# Patient Record
Sex: MALE | Race: WHITE | Employment: FULL TIME | URBAN - METROPOLITAN AREA
[De-identification: names, ages, dates, MRNs, and addresses within clinical notes are randomized per-mention and may not be internally consistent; named-entity substitution may affect disease eponyms.]

---

## 2017-05-02 ENCOUNTER — LAB ENCOUNTER (OUTPATIENT)
Dept: LAB | Age: 72
End: 2017-05-02
Payer: COMMERCIAL

## 2017-05-02 DIAGNOSIS — I10 HYPERTENSION, ESSENTIAL: Primary | ICD-10-CM

## 2017-05-02 PROCEDURE — 80048 BASIC METABOLIC PNL TOTAL CA: CPT

## 2017-05-02 PROCEDURE — 82570 ASSAY OF URINE CREATININE: CPT

## 2017-05-02 PROCEDURE — 82043 UR ALBUMIN QUANTITATIVE: CPT

## 2018-04-05 PROBLEM — Z96.651 STATUS POST RIGHT KNEE REPLACEMENT: Status: ACTIVE | Noted: 2018-04-05

## 2021-05-11 PROBLEM — Z00.00 ENCOUNTER FOR PREVENTIVE HEALTH EXAMINATION: Status: ACTIVE | Noted: 2021-05-11

## 2021-06-04 ENCOUNTER — APPOINTMENT (OUTPATIENT)
Dept: ORTHOPEDIC SURGERY | Facility: CLINIC | Age: 76
End: 2021-06-04
Payer: COMMERCIAL

## 2021-06-04 VITALS
WEIGHT: 185 LBS | SYSTOLIC BLOOD PRESSURE: 160 MMHG | HEIGHT: 74 IN | DIASTOLIC BLOOD PRESSURE: 93 MMHG | HEART RATE: 88 BPM | BODY MASS INDEX: 23.74 KG/M2

## 2021-06-04 DIAGNOSIS — Z87.39 PERSONAL HISTORY OF OTHER DISEASES OF THE MUSCULOSKELETAL SYSTEM AND CONNECTIVE TISSUE: ICD-10-CM

## 2021-06-04 DIAGNOSIS — M17.11 UNILATERAL PRIMARY OSTEOARTHRITIS, RIGHT KNEE: ICD-10-CM

## 2021-06-04 DIAGNOSIS — Z78.9 OTHER SPECIFIED HEALTH STATUS: ICD-10-CM

## 2021-06-04 DIAGNOSIS — Z86.79 PERSONAL HISTORY OF OTHER DISEASES OF THE CIRCULATORY SYSTEM: ICD-10-CM

## 2021-06-04 DIAGNOSIS — M16.11 UNILATERAL PRIMARY OSTEOARTHRITIS, RIGHT HIP: ICD-10-CM

## 2021-06-04 DIAGNOSIS — M17.12 UNILATERAL PRIMARY OSTEOARTHRITIS, LEFT KNEE: ICD-10-CM

## 2021-06-04 PROCEDURE — 99072 ADDL SUPL MATRL&STAF TM PHE: CPT

## 2021-06-04 PROCEDURE — 99204 OFFICE O/P NEW MOD 45 MIN: CPT

## 2021-06-04 PROCEDURE — 73501 X-RAY EXAM HIP UNI 1 VIEW: CPT | Mod: RT

## 2021-06-04 PROCEDURE — 73562 X-RAY EXAM OF KNEE 3: CPT | Mod: LT

## 2021-06-04 RX ORDER — ATORVASTATIN CALCIUM 20 MG/1
20 TABLET, FILM COATED ORAL
Refills: 0 | Status: ACTIVE | COMMUNITY

## 2021-06-04 RX ORDER — AMLODIPINE BESYLATE 5 MG/1
TABLET ORAL
Refills: 0 | Status: ACTIVE | COMMUNITY

## 2021-06-04 RX ORDER — ASPIRIN 81 MG
81 TABLET, DELAYED RELEASE (ENTERIC COATED) ORAL
Refills: 0 | Status: ACTIVE | COMMUNITY

## 2021-06-04 NOTE — HISTORY OF PRESENT ILLNESS
[Pain Location] : pain [7] : a current pain level of 7/10 [2] : a minimum pain level of 2/10 [8] : a maximum pain level of 8/10 [Walking] : worsened by walking [Rest] : relieved by rest [Bending] : worsened by bending [Hip Movement] : worsened by hip movement [de-identified] : 74 y/o M presents with left knee pain which started a couple of months ago. He is having sharp pain. The pain is in the medial aspect of the knee. He has difficulty walking due to his pain. He is s/p right TKA with a good outcome. He also c/o right hip pain which he describes as sharp. The pain sometimes radiates down his right leg. He has difficulty with bending as well as putting on his shoes and socks. He has more pain in the left knee than in the right hip. He has no hx of blood clots.

## 2021-06-04 NOTE — REVIEW OF SYSTEMS
[Joint Pain] : joint pain [Joint Stiffness] : joint stiffness [Joint Swelling] : joint swelling [Negative] : Heme/Lymph [FreeTextEntry9] : left knee and right hip

## 2021-06-04 NOTE — PHYSICAL EXAM
[LE] : Sensory: Intact in bilateral lower extremities [ALL] : dorsalis pedis, posterior tibial, femoral, popliteal, and radial 2+ and symmetric bilaterally [Normal] : Alert and in no acute distress [Cane] : ambulates with cane [Poor Appearance] : well-appearing [de-identified] : GENERAL APPEARANCE: Well nourished and hydrated, pleasant, alert, and oriented x 3. Appears their stated age. \par HEENT: Normocephalic, extraocular eye motion intact. Nasal septum midline. Oral cavity clear. External auditory canal clear. \par RESPIRATORY: Breath sounds clear and audible in all lobes. No wheezing, No accessory muscle use.\par CARDIOVASCULAR: No apparent abnormalities. No lower leg edema. No varicosities. Pedal pulses are palpable.\par NEUROLOGIC: Sensation is normal, no muscle weakness in the upper or lower extremities.\par DERMATOLOGIC: No apparent skin lesions, moist, warm, no rash.\par SPINE: Cervical spine appears normal and moves freely; thoracic spine appears normal and moves freely; lumbosacral spine appears normal and moves freely, normal, nontender.\par MUSCULOSKELETAL: Hands, wrists, and elbows are normal and move freely, shoulders are normal and move freely.  [de-identified] : Left knee exam shows medial joint line tenderness, no effusion\par Right hip exam shows pain with SLR, positive Stinchfield [de-identified] : 3V xray of the left knee done in the office today and reviewed by Dr. Lucian Rawls demonstrates bone on bone medial compartmental osteoarthritis \par \par \par 1V xray of the pelvis done in the office today and reviewed by Dr. Lucian Rawls demonstrates bone on bone right hip osteoarthritis

## 2021-06-04 NOTE — DISCUSSION/SUMMARY
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 Conservative therapy and surgical options discussed in detail with the patient. The pt is a candidate for a left TKA and a right GWEN. We counseled the pt regarding which surgery he should pursue first. The pt understands that he should pursue surgery for the joint with the most pain. He comprehends the risk of infection for both the TKA and GWEN. He is also aware of the risks of dislocation and falling post-op for the GWEN. We discussed pre-op, surgery, and post-op in detail. He is experiencing more pain in the left knee than in the right hip. He will consider pursuing the left TKA. In regards for temporary relief for the right hip, we provided an rx for an US guided hip injection. If he decides to pursue the left TKA, he will contact the office. All his questions were answered. \par \par The patient is a 75 year individual with end stage arthritis of their left knee joint. Based upon the patient's continued symptoms and failure to respond to conservative treatment I have recommended a left total knee arthroplasty for this patient. A long discussion took place with the patient describing what a total joint replacement is and what the procedure would entail. A total knee arthroplasty model, similar to the implant that was used during the operation, was utilized to demonstrate and to discuss the various bearing surfaces of the implants. The hospitalization and post-operative care and rehabilitation were also discussed. The use of perioperative antibiotics and DVT prophylaxis were discussed. The risk, benefits and alternatives to a surgical intervention were discussed at length with the patient. The patient was also advised of risks related to the medical comorbidities, elevated body mass index (BMI), and smoking where applicable. We discussed how to reduce modifiable risk factors and encouraged smoking cessation were applicable.. A lengthy discussion took place to review the most common complications including but not limited to: deep vein thrombosis, pulmonary embolus, heart attack, stroke, infection, wound breakdown, numbness, damage to nerves, tendon, muscles, arteries or other blood vessels, death and other possible complications from anesthesia. The patient was told that we will take steps to minimize these risks by using sterile technique, antibiotics and DVT prophylaxis when appropriate and follow the patient postoperatively in the office setting to monitor progress. The possibility of recurrent pain, no improvement in pain and actual worsening of pain were also discussed with the patient.\par The discharge plan of care focused on the patient going home following surgery. The patient was encouraged to make the necessary arrangements to have someone stay with them when they are discharged home. Following discharge, a home care nurse was to the patient. The home care nurse would open the patient’s home care case and request home physical therapy services. Home physical therapy was to commence following discharge provided it was appropriate and covered by the health insurance benefit plan. \par The benefits of surgery were discussed with the patient including the potential for improving his current clinical condition through operative intervention. Alternatives to surgical intervention including continued conservative management were also discussed in detail. All questions were answered to the satisfaction of the patient. The treatment plan of care, as well as a model of a total knee arthroplasty equivalent to the one that will be used for their total joint replacement, was shared with the patient. The patient agreed to the plan of care as well as the use of implants in their total joint replacement. \par \par The patient is a 75 year individual with end stage arthritis of their right hip joint. Based upon the patient's continued symptoms and failure to respond to conservative treatment I have recommended a right total hip arthroplasty for this patient. A long discussion took place with the patient describing what a total joint replacement is and what the procedure would entail. A total hip arthroplasty model, similar to the implant that will be used during the operation, was utilized to demonstrate and to discuss the various bearing surfaces of the implants. The hospitalization and post-operative care and rehabilitation were also discussed. The use of perioperative antibiotics and DVT prophylaxis were discussed. The risk, benefits and alternatives to a surgical intervention were discussed at length with the patient. The patient was also advised of risks related to the medical comorbidities, elevated body mass index (BMI), and smoking where applicable. We discussed how to reduce modifiable risk factors and encouraged smoking cessation were applicable.. A lengthy discussion took place to review the most common complications including but not limited to: deep vein thrombosis, pulmonary embolus, heart attack, stroke, infection, wound breakdown, numbness, damage to nerves, tendon, muscles, arteries or other blood vessels, death and other possible complications from anesthesia. The patient was told that we will take steps to minimize these risks by using sterile technique, antibiotics and DVT prophylaxis when appropriate and follow the patient postoperatively in the office setting to monitor progress. The possibility of recurrent pain, no improvement in pain and actual worsening of pain were also discussed with the patient.\par The discharge plan of care focused on the patient going home following surgery. The patient was encouraged to make the necessary arrangements to have someone stay with them when they are discharged home. Following discharge, a home care nurse will visit the patient. The home care nurse will open your home care case and request home physical therapy services. Home physical therapy will commence following discharge provided it is appropriate and covered by the health insurance benefit plan. \par The benefits of surgery were discussed with the patient including the potential for improving his current clinical condition through operative intervention. Alternatives to surgical intervention including continued conservative management were also discussed in detail. All questions were answered to the satisfaction of the patient. The treatment plan of care, as well as a model of a total hip arthroplasty equivalent to the one that will be used for their total joint replacement, was shared with the patient. The patient agreed to the plan of care as well as the use of implants in their total joint replacement.

## 2021-06-04 NOTE — END OF VISIT
[FreeTextEntry3] : I, Jarrod Villa, acted solely as a scribe for Dr. Lucian Rawls on this date 06/04/2021.

## 2021-06-28 ENCOUNTER — APPOINTMENT (OUTPATIENT)
Dept: INTERVENTIONAL RADIOLOGY/VASCULAR | Facility: CLINIC | Age: 76
End: 2021-06-28

## 2021-07-29 ENCOUNTER — APPOINTMENT (OUTPATIENT)
Dept: ORTHOPEDIC SURGERY | Facility: HOSPITAL | Age: 76
End: 2021-07-29

## 2021-08-18 ENCOUNTER — APPOINTMENT (OUTPATIENT)
Dept: ORTHOPEDIC SURGERY | Facility: CLINIC | Age: 76
End: 2021-08-18

## 2021-09-21 ENCOUNTER — APPOINTMENT (OUTPATIENT)
Dept: ORTHOPEDIC SURGERY | Facility: CLINIC | Age: 76
End: 2021-09-21

## 2021-10-20 ENCOUNTER — APPOINTMENT (OUTPATIENT)
Dept: ORTHOPEDIC SURGERY | Facility: CLINIC | Age: 76
End: 2021-10-20

## 2022-05-06 ENCOUNTER — APPOINTMENT (OUTPATIENT)
Dept: UROLOGY | Facility: CLINIC | Age: 77
End: 2022-05-06
Payer: MEDICARE

## 2022-05-06 VITALS
HEART RATE: 81 BPM | HEIGHT: 74 IN | WEIGHT: 183 LBS | SYSTOLIC BLOOD PRESSURE: 167 MMHG | BODY MASS INDEX: 23.49 KG/M2 | DIASTOLIC BLOOD PRESSURE: 75 MMHG

## 2022-05-06 DIAGNOSIS — N52.9 MALE ERECTILE DYSFUNCTION, UNSPECIFIED: ICD-10-CM

## 2022-05-06 PROCEDURE — 99203 OFFICE O/P NEW LOW 30 MIN: CPT

## 2022-05-06 RX ORDER — TADALAFIL 20 MG/1
20 TABLET ORAL
Qty: 10 | Refills: 3 | Status: ACTIVE | COMMUNITY
Start: 2022-05-06 | End: 1900-01-01

## 2022-05-06 NOTE — PHYSICAL EXAM
[General Appearance - Well Developed] : well developed [General Appearance - Well Nourished] : well nourished [General Appearance - In No Acute Distress] : no acute distress [Urethral Meatus] : meatus normal [Penis Abnormality] : normal circumcised penis [Testes Tenderness] : no tenderness of the testes [Testes Mass (___cm)] : there were no testicular masses [Prostate Size ___ (0-4)] : prostate size [unfilled] (scale: 0-4) [Oriented To Time, Place, And Person] : oriented to person, place, and time

## 2022-05-14 NOTE — ASSESSMENT
[FreeTextEntry1] : Advise PSA and testosterone level, Trial of tadalafil 20 mg po 1 hour before coitus as needed, precautions reviewed

## 2022-05-14 NOTE — HISTORY OF PRESENT ILLNESS
[FreeTextEntry1] : c/o ED Demies AM erections/ unable to penetrate [Erectile Dysfunction] : Erectile Dysfunction [Dysuria] : no dysuria [Hematuria - Gross] : no gross hematuria [None] : None

## 2022-07-28 RX ORDER — SILDENAFIL 100 MG/1
100 TABLET, FILM COATED ORAL
Qty: 30 | Refills: 3 | Status: ACTIVE | COMMUNITY
Start: 2022-07-27 | End: 1900-01-01

## 2023-02-22 ENCOUNTER — OFFICE (OUTPATIENT)
Dept: URBAN - METROPOLITAN AREA CLINIC 114 | Facility: CLINIC | Age: 78
Setting detail: OPHTHALMOLOGY
End: 2023-02-22
Payer: MEDICARE

## 2023-02-22 DIAGNOSIS — H43.813: ICD-10-CM

## 2023-02-22 DIAGNOSIS — H25.89: ICD-10-CM

## 2023-02-22 DIAGNOSIS — H25.13: ICD-10-CM

## 2023-02-22 PROCEDURE — 92004 COMPRE OPH EXAM NEW PT 1/>: CPT | Performed by: SPECIALIST

## 2023-02-22 ASSESSMENT — REFRACTION_MANIFEST
OS_CYLINDER: -1.00
OD_ADD: +2.50
OD_VA2: 20/20(J1+)
OS_SPHERE: +0.75
OS_ADD: +2.50
OD_CYLINDER: -0.50
OD_SPHERE: PLANO
OS_AXIS: 45
OD_VA1: 20/25
OS_VA1: 20/20-1
OS_VA2: 20/20(J1+)
OD_AXIS: 135

## 2023-02-22 ASSESSMENT — SPHEQUIV_DERIVED
OS_SPHEQUIV: 0.25
OS_SPHEQUIV: 0

## 2023-02-22 ASSESSMENT — REFRACTION_AUTOREFRACTION
OS_CYLINDER: -1.50
OS_AXIS: 50
OS_SPHERE: +0.75
OD_SPHERE: UNABLE

## 2023-02-22 ASSESSMENT — KERATOMETRY
OD_AXISANGLE_DEGREES: 132
OD_K2POWER_DIOPTERS: 43.00
OD_K1POWER_DIOPTERS: 42.50
OS_AXISANGLE_DEGREES: 112
OS_K2POWER_DIOPTERS: 42.75
OS_K1POWER_DIOPTERS: 42.50

## 2023-02-22 ASSESSMENT — VISUAL ACUITY
OD_BCVA: 20/40+
OS_BCVA: 20/30+1

## 2023-02-22 ASSESSMENT — AXIALLENGTH_DERIVED
OS_AL: 23.818
OS_AL: 23.9177

## 2023-02-22 ASSESSMENT — CONFRONTATIONAL VISUAL FIELD TEST (CVF)
OD_FINDINGS: FULL
OS_FINDINGS: FULL

## 2023-11-01 ENCOUNTER — OFFICE (OUTPATIENT)
Dept: URBAN - METROPOLITAN AREA CLINIC 114 | Facility: CLINIC | Age: 78
Setting detail: OPHTHALMOLOGY
End: 2023-11-01
Payer: MEDICARE

## 2023-11-01 DIAGNOSIS — H25.13: ICD-10-CM

## 2023-11-01 DIAGNOSIS — H43.813: ICD-10-CM

## 2023-11-01 DIAGNOSIS — H25.89: ICD-10-CM

## 2023-11-01 PROCEDURE — 99213 OFFICE O/P EST LOW 20 MIN: CPT | Performed by: SPECIALIST

## 2023-11-01 ASSESSMENT — REFRACTION_AUTOREFRACTION
OS_AXIS: 43
OD_CYLINDER: -0.25
OS_CYLINDER: -1.25
OD_SPHERE: -0.50
OD_AXIS: 109
OS_SPHERE: +1.00

## 2023-11-01 ASSESSMENT — CONFRONTATIONAL VISUAL FIELD TEST (CVF)
OD_FINDINGS: FULL
OS_FINDINGS: FULL

## 2023-11-01 ASSESSMENT — SPHEQUIV_DERIVED
OS_SPHEQUIV: 0.375
OD_SPHEQUIV: -0.625

## 2024-05-09 ENCOUNTER — RX ONLY (RX ONLY)
Age: 79
End: 2024-05-09

## 2024-05-09 ENCOUNTER — OFFICE (OUTPATIENT)
Dept: URBAN - METROPOLITAN AREA CLINIC 104 | Facility: CLINIC | Age: 79
Setting detail: OPHTHALMOLOGY
End: 2024-05-09
Payer: MEDICARE

## 2024-05-09 DIAGNOSIS — H43.813: ICD-10-CM

## 2024-05-09 DIAGNOSIS — H25.13: ICD-10-CM

## 2024-05-09 DIAGNOSIS — H25.89: ICD-10-CM

## 2024-05-09 PROCEDURE — 92014 COMPRE OPH EXAM EST PT 1/>: CPT | Performed by: SPECIALIST

## 2024-05-09 ASSESSMENT — CONFRONTATIONAL VISUAL FIELD TEST (CVF)
OS_FINDINGS: FULL
OD_FINDINGS: FULL

## 2024-06-04 ENCOUNTER — APPOINTMENT (OUTPATIENT)
Dept: ORTHOPEDIC SURGERY | Facility: CLINIC | Age: 79
End: 2024-06-04

## 2024-06-04 ENCOUNTER — APPOINTMENT (OUTPATIENT)
Dept: ORTHOPEDIC SURGERY | Facility: CLINIC | Age: 79
End: 2024-06-04
Payer: MEDICARE

## 2024-06-04 VITALS — WEIGHT: 182 LBS | BODY MASS INDEX: 23.36 KG/M2 | RESPIRATION RATE: 14 BRPM | HEIGHT: 74 IN

## 2024-06-04 DIAGNOSIS — M65.831 OTHER SYNOVITIS AND TENOSYNOVITIS, RIGHT FOREARM: ICD-10-CM

## 2024-06-04 PROCEDURE — 99204 OFFICE O/P NEW MOD 45 MIN: CPT

## 2024-06-04 PROCEDURE — 73110 X-RAY EXAM OF WRIST: CPT | Mod: 50

## 2024-06-04 NOTE — PHYSICAL EXAM
[de-identified] : Physical exam shows the patient to be alert and oriented x3, capable of ambulation. The patient is well-developed and well-nourished in no apparent respiratory distress. Majority of the skin is intact bilaterally in the upper extremities without lymphadenopathy at the elbows.  There is a mass palpable along the root of the EPL which increases and decreases in size with contraction.  There is no tenderness over the scaphoid scapholunate or lunotriquetral ligament no pain upon forced flexion extension pronation supination.  No tenderness over the CMC or STT joints bilaterally.  No tenderness over the FCR or second dorsal compartment no tenderness over the fourth fifth or sixth extensor compartments.  There is good capillary refill of the digits bilaterally.There is no masses or sensitivity over the median and ulnar nerves at the level of the wrist. There is a negative Tinel's and negative Phalen's sign bilaterally. The sensation is grossly intact bilaterally. [de-identified] : PA lateral and oblique of both wrist shows radial scaphoid arthritis with incongruity of the littlest lines on the right greater than left side.  The midcarpal joints appear to be well-preserved.  There is also degenerative changes of the STT joint bilaterally.  MRI of the right wrist demonstrates a 2.4 cm fluid collection in the dorsal region of the first CMC joint consistent with tenosynovitis of the EPL.  Which structurally and functionally appears to be in tact on physical exam.  The EPB APL and extensor digitorum ECU tendinopathy appears to be clinically asymptomatic as well as the first CMC arthritis and TFCC complex tear which was seen on MRI.  The capitate distal scaphoid and distal radius subcortical degeneration also clinically asymptomatic as well as the radial scaphoid effusion.

## 2024-06-04 NOTE — HISTORY OF PRESENT ILLNESS
[Right] : right hand dominant [FreeTextEntry1] : Patient here for initial evaluation of right wrist tenosynovitis of the EPL tendon.  Patient had 1 previous aspiration by outside MD with minimal fluid aspirated.  Patient has MRI from December 30, 2023 which is suggestive of tenosynovitis of the right wrist with associated arthritis.  Patient noticed the mass approximately 6 months ago.

## 2024-06-04 NOTE — CONSULT LETTER
[Dear  ___] : Dear  [unfilled], [Consult Letter:] : I had the pleasure of evaluating your patient, [unfilled]. [Please see my note below.] : Please see my note below. [Consult Closing:] : Thank you very much for allowing me to participate in the care of this patient.  If you have any questions, please do not hesitate to contact me. [Sincerely,] : Sincerely, [FreeTextEntry3] : Tawanda Bailey M.D., FAAOS Co-Director [facility]

## 2024-06-04 NOTE — ASSESSMENT
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  His chief complaint appears to be coming from the soft tissue mass around the EPL which is suggestive of an EPL tendinitis without rupture.  Options were discussed ranging from conservative management, aspiration, injection, or surgery.  Risk associated with each option discussed including the risk of tendon rupture, development of symptomatic arthritis, tissue loss functional loss and other issues associated with both conservative management injections and surgery were discussed and all questions answered.  Patient would like to proceed with right EPL decompression and synovectomy with subcutaneous transposition to reduce the risk of future EPL tendon rupture.  The other arthritic conditions will not be addressed since they are clinically asymptomatic at this time.

## 2024-06-12 ENCOUNTER — TRANSCRIPTION ENCOUNTER (OUTPATIENT)
Age: 79
End: 2024-06-12

## 2024-06-12 ENCOUNTER — APPOINTMENT (OUTPATIENT)
Dept: ORTHOPEDIC SURGERY | Facility: CLINIC | Age: 79
End: 2024-06-12

## 2024-06-12 PROCEDURE — 99442: CPT | Mod: 93

## 2024-07-03 ENCOUNTER — APPOINTMENT (OUTPATIENT)
Dept: ORTHOPEDIC SURGERY | Facility: AMBULATORY SURGERY CENTER | Age: 79
End: 2024-07-03

## 2024-07-12 ENCOUNTER — APPOINTMENT (OUTPATIENT)
Dept: ORTHOPEDIC SURGERY | Facility: CLINIC | Age: 79
End: 2024-07-12

## 2024-07-12 ENCOUNTER — APPOINTMENT (OUTPATIENT)
Dept: ORTHOPEDIC SURGERY | Facility: CLINIC | Age: 79
End: 2024-07-12
Payer: MEDICARE

## 2024-07-12 VITALS — BODY MASS INDEX: 23.36 KG/M2 | WEIGHT: 182 LBS | HEIGHT: 74 IN

## 2024-07-12 DIAGNOSIS — M65.831 OTHER SYNOVITIS AND TENOSYNOVITIS, RIGHT FOREARM: ICD-10-CM

## 2024-07-12 PROCEDURE — 99204 OFFICE O/P NEW MOD 45 MIN: CPT

## 2024-07-12 PROCEDURE — 73110 X-RAY EXAM OF WRIST: CPT | Mod: RT

## 2024-07-19 ENCOUNTER — TRANSCRIPTION ENCOUNTER (OUTPATIENT)
Age: 79
End: 2024-07-19

## 2024-07-19 NOTE — ASU DISCHARGE PLAN (ADULT/PEDIATRIC) - ASU DC SPECIAL INSTRUCTIONSFT
Co-Directors: Tawanda Bailey MD; Saqib Jean MD; Corey Ann MD   [facility] [address] [place] 	  [place] [address] 	 	  Phone [phone] (HAND), Fax [phone]    [email or web address]    Hand Surgery Post Operative Instructions      DRESSING CARE:  1.	Please keep bandage ON and DRY until you return to the office for your 1st postoperative visit.   2.	In the shower you must cover bandage with a plastic bag. You can use tape or a rubber band so no water leaks into the bag.   3.	Please do not exercise as that leads to excessive sweating as the bandage will therefore become moist/ wet.    4.	Do not remove or change your bandage. You may apply more tape if dressing starts to unravel.   5.	Please do not insert any objects, such as a pencil, down into the bandage.     ELEVATION:  1.	Keep hand/wrist above heart level at all times or until bandage feels loose. This will help with swelling of the fingers and can be accomplished by using the FOAM PILLOW.   2.	 A sling will not hold your hand/wrist above your heart and therefore its use should be limited (it may also cause shoulder stiffness).     ACTIVITY:  1.	Moving your fingers daily after surgery is very important to prevent stiffness. Please open your fingers completely and close your fingers completely to achieve full range of motion.  **UNLESS TENDON REPAIR OR NERVE REPAIR SURGERY PERFORMED    2.	Move all joints of the extremity that are not immobilized to prevent stiffness (i.e. shoulder, elbow, fingers, and thumb unless instructed otherwise).   3.	Avoid activities which may re-injure your hand or finger.     DIET:   Regular diet. Start light and progress as tolerated.     PAIN MEDICINE:   1.	Pain medicine was sent to your pharmacy.  2.	Take pain medicine on an “AS NEEDED” basis according to your doctor’s instructions.   3.	Your pain will decrease over the next few days allowing you to:   •	Decrease your pain medicine quantity until you stop.   •	Increase the time between doses until you stop.   4.	You should not drink alcoholic beverages while on pain medication.   5.	Take pain medicine with food to prevent nausea.   6.	Constipation can occur. If no bowel movement occurs within 48 hours take a laxative of your choice (over the counter).	 	      CONTACT PHYSICIAN FOR:   Slight pain, swelling and bluish discoloration are to be expected. If you have breathing difficulty or chest pain dial 911 immediately. However, if the following symptoms occur notify your physician:   •	Temperature above 101° F 	        • Inability to urinate in 8 hours   •	Uncontrolled nausea/vomiting   	• Progressively increasing pain   •	Signs of wound infection 	                • Excessive bleeding  (Redness, swelling, pus-like drainage)     • Increasing numbness   •	Excessive swelling and tightness 	• Splint or cast that is too tight      OFFICE APPOINTMENT:    A staff member from the office will call you in the next 1-2 days to schedule your 1st Post Operative appointment to see your physician back in the office. *IF someone does not reach out to you in the next 1-2 days please call the office.      Patient Name _________________________________ Signature ______________________________ Date__________    Witness _____________________________________________________ Date ______________

## 2024-07-19 NOTE — BRIEF OPERATIVE NOTE - NSICDXBRIEFPROCEDURE_GEN_ALL_CORE_FT
PROCEDURES:  Release of extensor tendon of thumb 19-Jul-2024 07:41:09  Emmanuel High  Synovectomy, wrist, dorsum 19-Jul-2024 07:41:30  Emmanuel High

## 2024-07-22 ENCOUNTER — TRANSCRIPTION ENCOUNTER (OUTPATIENT)
Age: 79
End: 2024-07-22

## 2024-07-22 RX ORDER — TRAMADOL HYDROCHLORIDE 50 MG/1
50 TABLET, COATED ORAL
Qty: 15 | Refills: 0 | Status: ACTIVE | COMMUNITY
Start: 2024-07-22 | End: 1900-01-01

## 2024-07-22 NOTE — ASU PATIENT PROFILE, ADULT - FALL HARM RISK - UNIVERSAL INTERVENTIONS
Bed in lowest position, wheels locked, appropriate side rails in place/Call bell, personal items and telephone in reach/Instruct patient to call for assistance before getting out of bed or chair/Non-slip footwear when patient is out of bed/[place] to call system/Physically safe environment - no spills, clutter or unnecessary equipment/Purposeful Proactive Rounding/Room/bathroom lighting operational, light cord in reach

## 2024-07-22 NOTE — ASU PATIENT PROFILE, ADULT - NS PREOP UNDERSTANDS INFO
No food from midnight clears  bring photo id and insurance card/yes No food from midnight clears  bring photo id and insurance card . Time given by MD/yes

## 2024-07-23 ENCOUNTER — RESULT REVIEW (OUTPATIENT)
Age: 79
End: 2024-07-23

## 2024-07-23 ENCOUNTER — APPOINTMENT (OUTPATIENT)
Dept: ORTHOPEDIC SURGERY | Facility: AMBULATORY SURGERY CENTER | Age: 79
End: 2024-07-23
Payer: MEDICARE

## 2024-07-23 ENCOUNTER — OUTPATIENT (OUTPATIENT)
Dept: OUTPATIENT SERVICES | Facility: HOSPITAL | Age: 79
LOS: 1 days | Discharge: HOME | End: 2024-07-23
Payer: MEDICARE

## 2024-07-23 VITALS
SYSTOLIC BLOOD PRESSURE: 150 MMHG | DIASTOLIC BLOOD PRESSURE: 69 MMHG | TEMPERATURE: 98 F | WEIGHT: 179.9 LBS | OXYGEN SATURATION: 98 % | HEIGHT: 74 IN | HEART RATE: 62 BPM | RESPIRATION RATE: 16 BRPM

## 2024-07-23 VITALS
DIASTOLIC BLOOD PRESSURE: 70 MMHG | RESPIRATION RATE: 16 BRPM | TEMPERATURE: 98 F | HEART RATE: 63 BPM | OXYGEN SATURATION: 97 % | SYSTOLIC BLOOD PRESSURE: 146 MMHG

## 2024-07-23 DIAGNOSIS — Z98.890 OTHER SPECIFIED POSTPROCEDURAL STATES: Chronic | ICD-10-CM

## 2024-07-23 DIAGNOSIS — Z96.653 PRESENCE OF ARTIFICIAL KNEE JOINT, BILATERAL: Chronic | ICD-10-CM

## 2024-07-23 PROCEDURE — 88304 TISSUE EXAM BY PATHOLOGIST: CPT | Mod: 26

## 2024-07-23 PROCEDURE — 26480 TRANSPLANT HAND TENDON: CPT | Mod: RT

## 2024-07-23 RX ORDER — METOPROLOL TARTRATE 50 MG
1 TABLET ORAL
Refills: 0 | DISCHARGE

## 2024-07-23 RX ORDER — AMLODIPINE BESYLATE 2.5 MG/1
1 TABLET ORAL
Refills: 0 | DISCHARGE

## 2024-07-23 RX ORDER — ATORVASTATIN CALCIUM 20 MG/1
1 TABLET, FILM COATED ORAL
Refills: 0 | DISCHARGE

## 2024-07-23 RX ORDER — DEXTROSE MONOHYDRATE AND SODIUM CHLORIDE 5; .3 G/100ML; G/100ML
1000 INJECTION, SOLUTION INTRAVENOUS
Refills: 0 | Status: DISCONTINUED | OUTPATIENT
Start: 2024-07-23 | End: 2024-07-23

## 2024-07-23 RX ORDER — ASPIRIN 325 MG/1
1 TABLET, FILM COATED ORAL
Refills: 0 | DISCHARGE

## 2024-07-23 RX ORDER — ACETAMINOPHEN 325 MG
650 TABLET ORAL ONCE
Refills: 0 | Status: DISCONTINUED | OUTPATIENT
Start: 2024-07-23 | End: 2024-07-23

## 2024-07-23 RX ORDER — OXYCODONE HYDROCHLORIDE 100 MG/5ML
5 SOLUTION ORAL ONCE
Refills: 0 | Status: DISCONTINUED | OUTPATIENT
Start: 2024-07-23 | End: 2024-07-23

## 2024-07-23 RX ORDER — ONDANSETRON HYDROCHLORIDE 2 MG/ML
4 INJECTION INTRAMUSCULAR; INTRAVENOUS ONCE
Refills: 0 | Status: DISCONTINUED | OUTPATIENT
Start: 2024-07-23 | End: 2024-07-23

## 2024-07-23 RX ADMIN — Medication 1 APPLICATION(S): at 09:35

## 2024-07-23 NOTE — PRE-ANESTHESIA EVALUATION ADULT - NSANTHTOBACCOSD_GEN_ALL_CORE
Status post stent placement in the past   He stopped his statins recently, most likely it will be restarted  We will order lipid panel to see his baseline  No

## 2024-07-23 NOTE — PRE-ANESTHESIA EVALUATION ADULT - NSANTHADDINFOFT_GEN_ALL_CORE
Discussed risks of sedation and general anesthesia. All questions answered and patient is in agreement    Discussed the emergency plan of general anesthesia and explained all of the risks and benefits of general anesthesia- including risk of cardiopulmonary compromise, eye injury, sore throat, airway injury, postoperative nausea and vomiting, and nerve injury. All questions were answered and patient is in agreement for sedation, understanding the risks of general anesthesia if the need arises  Discussed risks of general anesthesia, sedation, and peripheral nerve block-including risk of nerve injury. All questions answered and patient is in agreement

## 2024-07-24 LAB
GRAM STN FLD: SIGNIFICANT CHANGE UP
GRAM STN FLD: SIGNIFICANT CHANGE UP
NIGHT BLUE STAIN TISS: SIGNIFICANT CHANGE UP
NIGHT BLUE STAIN TISS: SIGNIFICANT CHANGE UP
SPECIMEN SOURCE: SIGNIFICANT CHANGE UP

## 2024-07-25 LAB — GRAM STN FLD: ABNORMAL

## 2024-07-28 LAB
CULTURE RESULTS: ABNORMAL
CULTURE RESULTS: SIGNIFICANT CHANGE UP
SPECIMEN SOURCE: SIGNIFICANT CHANGE UP
SPECIMEN SOURCE: SIGNIFICANT CHANGE UP

## 2024-07-31 LAB — SURGICAL PATHOLOGY STUDY: SIGNIFICANT CHANGE UP

## 2024-08-05 ENCOUNTER — APPOINTMENT (OUTPATIENT)
Dept: ORTHOPEDIC SURGERY | Facility: CLINIC | Age: 79
End: 2024-08-05

## 2024-08-05 PROCEDURE — 99024 POSTOP FOLLOW-UP VISIT: CPT

## 2024-08-05 NOTE — HISTORY OF PRESENT ILLNESS
[___ Days Post Op] : post op day #[unfilled] [de-identified] : DOS: 7/23/24 [de-identified] : 13 days status post right extensor pollicis longus tenosynovectomy with subcutaneous transposition [de-identified] : The incision line is well-healed and he is moving the wrist and digits well without evidence of crepitus or instability with EPL functioning without crepitus good capillary refill and sensation grossly intact. [de-identified] : 13 days status post right extensor pollicis longus tenosynovectomy with subcutaneous transposition [de-identified] : The dressings were removed home program outlined referral to outside therapy  Return to the office in 4 to 6 weeks

## 2024-08-05 NOTE — HISTORY OF PRESENT ILLNESS
[___ Days Post Op] : post op day #[unfilled] [de-identified] : DOS: 7/23/24 [de-identified] : 13 days status post right extensor pollicis longus tenosynovectomy with subcutaneous transposition [de-identified] : The incision line is well-healed and he is moving the wrist and digits well without evidence of crepitus or instability with EPL functioning without crepitus good capillary refill and sensation grossly intact. [de-identified] : 13 days status post right extensor pollicis longus tenosynovectomy with subcutaneous transposition [de-identified] : The dressings were removed home program outlined referral to outside therapy  Return to the office in 4 to 6 weeks

## 2024-08-09 PROBLEM — C67.9 MALIGNANT NEOPLASM OF BLADDER, UNSPECIFIED: Chronic | Status: ACTIVE | Noted: 2024-07-23

## 2024-08-09 PROBLEM — E78.5 HYPERLIPIDEMIA, UNSPECIFIED: Chronic | Status: ACTIVE | Noted: 2024-07-23

## 2024-08-09 PROBLEM — I10 ESSENTIAL (PRIMARY) HYPERTENSION: Chronic | Status: ACTIVE | Noted: 2024-07-23

## 2024-08-12 ENCOUNTER — TRANSCRIPTION ENCOUNTER (OUTPATIENT)
Age: 79
End: 2024-08-12

## 2024-08-13 ENCOUNTER — TRANSCRIPTION ENCOUNTER (OUTPATIENT)
Age: 79
End: 2024-08-13

## 2024-08-15 ENCOUNTER — APPOINTMENT (OUTPATIENT)
Dept: ORTHOPEDIC SURGERY | Facility: CLINIC | Age: 79
End: 2024-08-15

## 2024-08-15 DIAGNOSIS — M65.831 OTHER SYNOVITIS AND TENOSYNOVITIS, RIGHT FOREARM: ICD-10-CM

## 2024-08-15 PROCEDURE — 99024 POSTOP FOLLOW-UP VISIT: CPT

## 2024-08-15 PROCEDURE — 10160 PNXR ASPIR ABSC HMTMA BULLA: CPT | Mod: 58,RT

## 2024-08-15 PROCEDURE — 76882 US LMTD JT/FCL EVL NVASC XTR: CPT

## 2024-08-15 NOTE — HISTORY OF PRESENT ILLNESS
[___ Days Post Op] : post op day #[unfilled] [de-identified] : DOS: 7/23/24 [de-identified] : 23 days status post right extensor pollicis longus tenosynovectomy with subcutaneous transposition. He had 2 sessions of O/T so far. There is slight swelling near the incision.  Surgical pathology report showed right hand tenosynovium, tenosynovium with reactive changes.  [de-identified] : The incision line is well-healed there is no evidence of infection but there is a palpable mass directly under the incision line which is nontender. Pronation/supination 80/80 flexion/extension 55/45 on the right 80/60 on the left  Flexor and extensor tendons intact including the EPL without crepitus  Good capillary refill sensation grossly intact [de-identified] : Ultrasound of the area of clinical concern on the dorsal aspect of the right wrist shows hypoechoic signals but the EPL is intact. [de-identified] : 23 days status post right extensor pollicis longus tenosynovectomy with subcutaneous transposition [de-identified] : Patient has a seroma of the right dorsal wrist without evidence of infection.  The risks, benefits, alternatives and associated differential diagnosis was discussed with the patient. Options ranged from conservative care, therapy to surgical intervention were reviewed and all questions answered. Risks included incomplete resolution of symptoms, worsening of symptoms recurrence, tissue loss, functional loss and other risks associated with treatment of this condition Patient appeared to have an excellent understanding of the risks as well as differential diagnosis associated with this condition.  Elected to proceed with aspiration.  After the area was cleaned with alcohol reduce the risk of infection a 20-gauge needle was utilized to aspirate the fluid.  3 cc of clear yellow fluid was obtained.  A half a cc of Kenalog 10 was placed into the seroma to reduce the risk of recurrence.  A sterile dressing and Coban wrap was applied.  Patient will continue with therapy  Keep regularly scheduled appointment but contact us earlier if there are issues or concerns discussed

## 2024-08-29 ENCOUNTER — APPOINTMENT (OUTPATIENT)
Dept: ORTHOPEDIC SURGERY | Facility: CLINIC | Age: 79
End: 2024-08-29
Payer: MEDICARE

## 2024-08-29 DIAGNOSIS — M65.831 OTHER SYNOVITIS AND TENOSYNOVITIS, RIGHT FOREARM: ICD-10-CM

## 2024-08-29 PROCEDURE — 99024 POSTOP FOLLOW-UP VISIT: CPT

## 2024-08-29 NOTE — HISTORY OF PRESENT ILLNESS
[de-identified] : DOS: 7/23/24. [de-identified] : 5 weeks 2 days status post right extensor pollicis longus tenosynovectomy with subcutaneous transposition. Surgical pathology report showed right hand tenosynovium, tenosynovium with reactive changes.  Patient had seroma right dorsal wrist aspirated at last visit. He continues home excercises. [de-identified] : No infection in the incision line is well-healed but there is some palpable fluid around the EPL of the right thumb which is functioning and full range of motion of the digits  Good capillary refill negative Tinel's sensation intact [de-identified] : 5 weeks 2 days status post right extensor pollicis longus tenosynovectomy with subcutaneous transposition.  [de-identified] : There has been significant resolution of the seroma without evidence of infection.  Patient appears to be doing well on his own without therapy.  He will continue his home program and if the symptoms do not resolve over the next 4 to 6 weeks return to the office for reevaluation.

## 2024-08-29 NOTE — HISTORY OF PRESENT ILLNESS
[de-identified] : DOS: 7/23/24. [de-identified] : 5 weeks 2 days status post right extensor pollicis longus tenosynovectomy with subcutaneous transposition. Surgical pathology report showed right hand tenosynovium, tenosynovium with reactive changes.  Patient had seroma right dorsal wrist aspirated at last visit. He continues home excercises. [de-identified] : No infection in the incision line is well-healed but there is some palpable fluid around the EPL of the right thumb which is functioning and full range of motion of the digits  Good capillary refill negative Tinel's sensation intact [de-identified] : 5 weeks 2 days status post right extensor pollicis longus tenosynovectomy with subcutaneous transposition.  [de-identified] : There has been significant resolution of the seroma without evidence of infection.  Patient appears to be doing well on his own without therapy.  He will continue his home program and if the symptoms do not resolve over the next 4 to 6 weeks return to the office for reevaluation.

## 2024-10-15 ENCOUNTER — OFFICE (OUTPATIENT)
Dept: URBAN - METROPOLITAN AREA CLINIC 104 | Facility: CLINIC | Age: 79
Setting detail: OPHTHALMOLOGY
End: 2024-10-15
Payer: MEDICARE

## 2024-10-15 DIAGNOSIS — H25.11: ICD-10-CM

## 2024-10-15 DIAGNOSIS — H25.13: ICD-10-CM

## 2024-10-15 DIAGNOSIS — H25.89: ICD-10-CM

## 2024-10-15 DIAGNOSIS — H35.373: ICD-10-CM

## 2024-10-15 DIAGNOSIS — H43.813: ICD-10-CM

## 2024-10-15 PROBLEM — H25.12 CATARACT SENILE NUCLEAR SCLEROSIS; RIGHT EYE, LEFT EYE, BOTH EYES: Status: ACTIVE | Noted: 2024-10-15

## 2024-10-15 PROCEDURE — 92136 OPHTHALMIC BIOMETRY: CPT | Mod: 26,RT | Performed by: SPECIALIST

## 2024-10-15 PROCEDURE — 92136 OPHTHALMIC BIOMETRY: CPT | Mod: TC | Performed by: SPECIALIST

## 2024-10-15 PROCEDURE — 92014 COMPRE OPH EXAM EST PT 1/>: CPT | Performed by: SPECIALIST

## 2024-10-15 PROCEDURE — 92134 CPTRZ OPH DX IMG PST SGM RTA: CPT | Performed by: SPECIALIST

## 2024-10-15 ASSESSMENT — KERATOMETRY
OD_CYLPOWER_DEGREES: 0.7
OD_AXISANGLE_DEGREES: 142
OS_CYLAXISANGLE_DEGREES: 160
OS_K2POWER_DIOPTERS: 42.51
OS_K2POWER_DIOPTERS: 42.51
OS_K1POWER_DIOPTERS: 42.35
OD_K1POWER_DIOPTERS: 42.24
OS_AXISANGLE_DEGREES: 160
OD_K1K2_AVERAGE: 42.59
OD_AXISANGLE2_DEGREES: 142
OS_AXISANGLE2_DEGREES: 160
OD_K2POWER_DIOPTERS: 42.94
OD_CYLAXISANGLE_DEGREES: 142
OS_CYLPOWER_DEGREES: 0.16
OD_K2POWER_DIOPTERS: 42.94
OS_K1K2_AVERAGE: 42.43
OS_AXISANGLE_DEGREES: 70
OD_AXISANGLE_DEGREES: 52
OS_K1POWER_DIOPTERS: 42.35
OD_K1POWER_DIOPTERS: 42.24

## 2024-10-15 ASSESSMENT — TONOMETRY
OS_IOP_MMHG: 14
OD_IOP_MMHG: 14

## 2024-10-15 ASSESSMENT — CONFRONTATIONAL VISUAL FIELD TEST (CVF)
OD_FINDINGS: FULL
OS_FINDINGS: FULL

## 2024-10-15 ASSESSMENT — REFRACTION_MANIFEST
OD_CYLINDER: -0.75
OS_VA1: 20/40
OD_VA1: 20/100
OD_SPHERE: -3.00
OD_AXIS: 105
OS_SPHERE: +1.00
OS_AXIS: 35
OS_CYLINDER: -1.25

## 2024-10-15 ASSESSMENT — REFRACTION_AUTOREFRACTION
OS_SPHERE: +1.00
OD_AXIS: 104
OS_CYLINDER: -1.25
OD_SPHERE: -3.00
OD_CYLINDER: -0.75
OS_AXIS: 37

## 2024-10-15 ASSESSMENT — VISUAL ACUITY
OS_BCVA: 20/150
OD_BCVA: 20/40

## 2024-11-15 ENCOUNTER — RX ONLY (RX ONLY)
Age: 79
End: 2024-11-15

## 2024-11-15 ENCOUNTER — ASC (OUTPATIENT)
Dept: URBAN - METROPOLITAN AREA SURGERY 8 | Facility: SURGERY | Age: 79
Setting detail: OPHTHALMOLOGY
End: 2024-11-15
Payer: MEDICARE

## 2024-11-15 DIAGNOSIS — H25.11: ICD-10-CM

## 2024-11-15 DIAGNOSIS — H52.211: ICD-10-CM

## 2024-11-15 PROCEDURE — A9270 NON-COVERED ITEM OR SERVICE: HCPCS | Mod: GY | Performed by: SPECIALIST

## 2024-11-15 PROCEDURE — FEMTO FEMTOSECOND LASER: Mod: GY | Performed by: SPECIALIST

## 2024-11-15 PROCEDURE — 66984 XCAPSL CTRC RMVL W/O ECP: CPT | Mod: RT | Performed by: SPECIALIST

## 2024-11-16 ENCOUNTER — OFFICE (OUTPATIENT)
Dept: URBAN - METROPOLITAN AREA CLINIC 104 | Facility: CLINIC | Age: 79
Setting detail: OPHTHALMOLOGY
End: 2024-11-16
Payer: MEDICARE

## 2024-11-16 DIAGNOSIS — Z96.1: ICD-10-CM

## 2024-11-16 PROCEDURE — 99024 POSTOP FOLLOW-UP VISIT: CPT | Performed by: OPTOMETRIST

## 2024-11-16 ASSESSMENT — TONOMETRY: OD_IOP_MMHG: 16

## 2024-11-16 ASSESSMENT — REFRACTION_MANIFEST
OS_CYLINDER: -1.25
OS_SPHERE: +1.00
OS_AXIS: 35
OS_VA1: 20/40
OD_AXIS: 105
OD_SPHERE: -3.00
OD_CYLINDER: -0.75
OD_VA1: 20/100

## 2024-11-16 ASSESSMENT — CONFRONTATIONAL VISUAL FIELD TEST (CVF)
OD_FINDINGS: FULL
OS_FINDINGS: FULL

## 2024-11-16 ASSESSMENT — KERATOMETRY
OS_AXISANGLE_DEGREES: 160
OD_K2POWER_DIOPTERS: 42.94
OS_K2POWER_DIOPTERS: 42.51
OD_AXISANGLE_DEGREES: 142
OD_K1POWER_DIOPTERS: 42.24
OS_K1POWER_DIOPTERS: 42.35

## 2024-11-16 ASSESSMENT — REFRACTION_AUTOREFRACTION
OS_SPHERE: +1.00
OD_CYLINDER: -0.75
OS_CYLINDER: -1.25
OS_AXIS: 37
OD_AXIS: 104
OD_SPHERE: -3.00

## 2024-11-16 ASSESSMENT — VISUAL ACUITY
OS_BCVA: 20/25+1
OD_BCVA: 20/40

## 2024-11-21 ENCOUNTER — OFFICE (OUTPATIENT)
Dept: URBAN - METROPOLITAN AREA CLINIC 104 | Facility: CLINIC | Age: 79
Setting detail: OPHTHALMOLOGY
End: 2024-11-21
Payer: MEDICARE

## 2024-11-21 DIAGNOSIS — Z96.1: ICD-10-CM

## 2024-11-21 PROCEDURE — 99024 POSTOP FOLLOW-UP VISIT: CPT | Performed by: OPTOMETRIST

## 2024-11-21 ASSESSMENT — REFRACTION_MANIFEST
OS_CYLINDER: -1.25
OS_SPHERE: +1.00
OD_SPHERE: -3.00
OS_VA1: 20/40
OS_AXIS: 35
OD_VA1: 20/100
OD_AXIS: 105
OD_CYLINDER: -0.75

## 2024-11-21 ASSESSMENT — REFRACTION_AUTOREFRACTION
OD_SPHERE: PLANO
OS_SPHERE: +1.00
OS_CYLINDER: -1.25
OS_AXIS: 37
OD_AXIS: 71
OD_CYLINDER: -0.25

## 2024-11-21 ASSESSMENT — TONOMETRY: OD_IOP_MMHG: 16

## 2024-11-21 ASSESSMENT — CONFRONTATIONAL VISUAL FIELD TEST (CVF)
OS_FINDINGS: FULL
OD_FINDINGS: FULL

## 2024-11-21 ASSESSMENT — KERATOMETRY
OS_AXISANGLE_DEGREES: 160
OD_K1POWER_DIOPTERS: 42.19
OD_AXISANGLE_DEGREES: 131
OS_K2POWER_DIOPTERS: 42.51
OD_K2POWER_DIOPTERS: 42.51
OS_K1POWER_DIOPTERS: 42.35

## 2024-11-21 ASSESSMENT — VISUAL ACUITY
OD_BCVA: 20/40
OS_BCVA: 20/25+2

## 2024-12-18 ENCOUNTER — OFFICE (OUTPATIENT)
Dept: URBAN - METROPOLITAN AREA CLINIC 104 | Facility: CLINIC | Age: 79
Setting detail: OPHTHALMOLOGY
End: 2024-12-18
Payer: MEDICARE

## 2024-12-18 DIAGNOSIS — Z96.1: ICD-10-CM

## 2024-12-18 PROCEDURE — 99024 POSTOP FOLLOW-UP VISIT: CPT | Performed by: OPTOMETRIST

## 2024-12-18 ASSESSMENT — REFRACTION_AUTOREFRACTION
OD_SPHERE: +0.50
OS_CYLINDER: -1.50
OD_CYLINDER: -0.50
OS_AXIS: 046
OS_SPHERE: +1.25
OD_AXIS: 072

## 2024-12-18 ASSESSMENT — TONOMETRY
OD_IOP_MMHG: 11
OS_IOP_MMHG: 12

## 2024-12-18 ASSESSMENT — KERATOMETRY
OD_K1POWER_DIOPTERS: 42.24
OD_K2POWER_DIOPTERS: 42.83
OD_AXISANGLE_DEGREES: 139

## 2024-12-18 ASSESSMENT — CONFRONTATIONAL VISUAL FIELD TEST (CVF)
OD_FINDINGS: FULL
OS_FINDINGS: FULL

## 2024-12-18 ASSESSMENT — VISUAL ACUITY
OS_BCVA: 20/20-1
OD_BCVA: 20/20-1

## 2025-01-28 ENCOUNTER — APPOINTMENT (OUTPATIENT)
Dept: ORTHOPEDIC SURGERY | Facility: CLINIC | Age: 80
End: 2025-01-28
Payer: MEDICARE

## 2025-01-28 DIAGNOSIS — R29.898 OTHER SYMPTOMS AND SIGNS INVOLVING THE MUSCULOSKELETAL SYSTEM: ICD-10-CM

## 2025-01-28 PROCEDURE — 73130 X-RAY EXAM OF HAND: CPT | Mod: LT

## 2025-01-28 PROCEDURE — 99213 OFFICE O/P EST LOW 20 MIN: CPT

## 2025-03-27 ENCOUNTER — APPOINTMENT (OUTPATIENT)
Dept: ORTHOPEDIC SURGERY | Facility: CLINIC | Age: 80
End: 2025-03-27

## 2025-03-27 DIAGNOSIS — R22.31 LOCALIZED SWELLING, MASS AND LUMP, RIGHT UPPER LIMB: ICD-10-CM

## 2025-03-27 PROCEDURE — 99213 OFFICE O/P EST LOW 20 MIN: CPT | Mod: 25

## 2025-03-27 PROCEDURE — 76882 US LMTD JT/FCL EVL NVASC XTR: CPT | Mod: RT

## 2025-03-27 PROCEDURE — 20550 NJX 1 TENDON SHEATH/LIGAMENT: CPT | Mod: RT

## 2025-04-18 ENCOUNTER — APPOINTMENT (OUTPATIENT)
Dept: OTOLARYNGOLOGY | Facility: CLINIC | Age: 80
End: 2025-04-18

## 2025-04-18 ENCOUNTER — NON-APPOINTMENT (OUTPATIENT)
Age: 80
End: 2025-04-18

## 2025-04-18 VITALS
SYSTOLIC BLOOD PRESSURE: 137 MMHG | BODY MASS INDEX: 24.12 KG/M2 | DIASTOLIC BLOOD PRESSURE: 75 MMHG | HEIGHT: 73 IN | HEART RATE: 62 BPM | WEIGHT: 182 LBS

## 2025-04-18 DIAGNOSIS — H60.63 UNSPECIFIED CHRONIC OTITIS EXTERNA, BILATERAL: ICD-10-CM

## 2025-04-18 DIAGNOSIS — K11.8 OTHER DISEASES OF SALIVARY GLANDS: ICD-10-CM

## 2025-04-18 DIAGNOSIS — J34.2 DEVIATED NASAL SEPTUM: ICD-10-CM

## 2025-04-18 DIAGNOSIS — Z83.3 FAMILY HISTORY OF DIABETES MELLITUS: ICD-10-CM

## 2025-04-18 DIAGNOSIS — Z78.9 OTHER SPECIFIED HEALTH STATUS: ICD-10-CM

## 2025-04-18 DIAGNOSIS — J31.0 CHRONIC RHINITIS: ICD-10-CM

## 2025-04-18 DIAGNOSIS — H93.A1 PULSATILE TINNITUS, RIGHT EAR: ICD-10-CM

## 2025-04-18 PROCEDURE — 92570 ACOUSTIC IMMITANCE TESTING: CPT

## 2025-04-18 PROCEDURE — 92557 COMPREHENSIVE HEARING TEST: CPT

## 2025-04-18 PROCEDURE — 99204 OFFICE O/P NEW MOD 45 MIN: CPT

## 2025-04-18 RX ORDER — CHROMIUM 200 MCG
TABLET ORAL
Refills: 0 | Status: ACTIVE | COMMUNITY

## 2025-04-18 RX ORDER — METOPROLOL SUCCINATE 25 MG/1
25 TABLET, EXTENDED RELEASE ORAL
Refills: 0 | Status: ACTIVE | COMMUNITY

## 2025-04-24 ENCOUNTER — OFFICE (OUTPATIENT)
Dept: URBAN - METROPOLITAN AREA CLINIC 104 | Facility: CLINIC | Age: 80
Setting detail: OPHTHALMOLOGY
End: 2025-04-24
Payer: MEDICARE

## 2025-04-24 DIAGNOSIS — H25.12: ICD-10-CM

## 2025-04-24 DIAGNOSIS — H43.813: ICD-10-CM

## 2025-04-24 DIAGNOSIS — Z96.1: ICD-10-CM

## 2025-04-24 DIAGNOSIS — H35.373: ICD-10-CM

## 2025-04-24 PROCEDURE — 92014 COMPRE OPH EXAM EST PT 1/>: CPT | Performed by: SPECIALIST

## 2025-04-24 PROCEDURE — 92134 CPTRZ OPH DX IMG PST SGM RTA: CPT | Performed by: SPECIALIST

## 2025-04-24 ASSESSMENT — REFRACTION_AUTOREFRACTION
OD_AXIS: 072
OS_CYLINDER: -1.50
OD_SPHERE: +0.50
OS_AXIS: 046
OD_CYLINDER: -0.50
OS_SPHERE: +1.25

## 2025-04-24 ASSESSMENT — CONFRONTATIONAL VISUAL FIELD TEST (CVF)
OD_FINDINGS: FULL
OS_FINDINGS: FULL

## 2025-04-24 ASSESSMENT — KERATOMETRY
OD_K2POWER_DIOPTERS: 42.83
OD_AXISANGLE_DEGREES: 139
OD_K1POWER_DIOPTERS: 42.24

## 2025-04-24 ASSESSMENT — VISUAL ACUITY
OD_BCVA: 20/25+2
OS_BCVA: 20/25+2

## 2025-04-24 ASSESSMENT — TONOMETRY
OS_IOP_MMHG: 14
OD_IOP_MMHG: 14

## (undated) DEVICE — WARMING BLANKET LOWER ADULT

## (undated) DEVICE — TOURNIQUET CUFF 24" DUAL PORT SINGLE BLADDER W PLC (BLACK)

## (undated) DEVICE — PACK HAND

## (undated) DEVICE — TOURNIQUET CUFF 18" DUAL PORT SINGLE BLADDER W PLC  (BLACK)

## (undated) DEVICE — SUT ETHILON 5-0 18" P-3

## (undated) DEVICE — MARKING PEN W RULER

## (undated) DEVICE — GLV 8 PROTEXIS (WHITE)

## (undated) DEVICE — VENODYNE/SCD SLEEVE CALF MEDIUM